# Patient Record
Sex: MALE | Race: OTHER | NOT HISPANIC OR LATINO | URBAN - METROPOLITAN AREA
[De-identification: names, ages, dates, MRNs, and addresses within clinical notes are randomized per-mention and may not be internally consistent; named-entity substitution may affect disease eponyms.]

---

## 2017-01-28 ENCOUNTER — EMERGENCY (EMERGENCY)
Facility: HOSPITAL | Age: 42
LOS: 1 days | Discharge: HOME | End: 2017-01-28
Attending: EMERGENCY MEDICINE | Admitting: EMERGENCY MEDICINE
Payer: MEDICAID

## 2017-01-28 VITALS
HEART RATE: 76 BPM | OXYGEN SATURATION: 100 % | DIASTOLIC BLOOD PRESSURE: 90 MMHG | SYSTOLIC BLOOD PRESSURE: 129 MMHG | TEMPERATURE: 98 F | RESPIRATION RATE: 16 BRPM

## 2017-01-28 VITALS
HEART RATE: 78 BPM | SYSTOLIC BLOOD PRESSURE: 105 MMHG | RESPIRATION RATE: 14 BRPM | OXYGEN SATURATION: 100 % | DIASTOLIC BLOOD PRESSURE: 66 MMHG

## 2017-01-28 PROBLEM — Z00.00 ENCOUNTER FOR PREVENTIVE HEALTH EXAMINATION: Status: ACTIVE | Noted: 2017-01-28

## 2017-01-28 PROCEDURE — 99284 EMERGENCY DEPT VISIT MOD MDM: CPT

## 2017-01-28 RX ORDER — METOCLOPRAMIDE HCL 10 MG
10 TABLET ORAL ONCE
Qty: 0 | Refills: 0 | Status: COMPLETED | OUTPATIENT
Start: 2017-01-28 | End: 2017-01-28

## 2017-01-28 RX ORDER — SODIUM CHLORIDE 9 MG/ML
1000 INJECTION INTRAMUSCULAR; INTRAVENOUS; SUBCUTANEOUS ONCE
Qty: 0 | Refills: 0 | Status: COMPLETED | OUTPATIENT
Start: 2017-01-28 | End: 2017-01-28

## 2017-01-28 RX ADMIN — Medication 10 MILLIGRAM(S): at 12:41

## 2017-01-28 RX ADMIN — SODIUM CHLORIDE 1000 MILLILITER(S): 9 INJECTION INTRAMUSCULAR; INTRAVENOUS; SUBCUTANEOUS at 13:38

## 2017-01-28 RX ADMIN — Medication 100 MILLIGRAM(S): at 12:41

## 2017-01-28 RX ADMIN — SODIUM CHLORIDE 1000 MILLILITER(S): 9 INJECTION INTRAMUSCULAR; INTRAVENOUS; SUBCUTANEOUS at 12:41

## 2017-01-28 NOTE — ED PROVIDER NOTE - MEDICAL DECISION MAKING DETAILS
42 yo male with PMH of EtOH abuse, anxiety disorder, depression, presents to the ED with EtOH withdrawal and headache. States his last drink was 5pm yesterday and began experiencing withdrawal symptoms including headache, nausea, and tremulousness at 9am today. Plan: librium, reglan, IV fluids

## 2017-01-28 NOTE — ED PROVIDER NOTE - ATTENDING CONTRIBUTION TO CARE
DR. LUCERO, ATTENDING MD-  I performed a face to face bedside interview with patient regarding history of present illness, review of symptoms and past medical history. I completed an independent physical exam.  I have discussed patient's plan of care with the resident.   Documentation as above in the note.    42 y/o male with h/o etoh abuse with c/o shakiness this am.  He had his last drink last night at 5pm.  Wants to go to rehab.  He spoke with [facility] and will go there for detox.  Denies f/c, ha, neck stiffness, cp, sob, cough, abd pain, n/v/d, dysuria, rash.  Afebrile, vs wnl, nad, ctabil, s1s2 rrr no m/r/g, abd soft non ttp no r/g, no cva tenderness b/l, no leg swelling b/l, no rash, mild fine tremor.  Mild etoh w/d, will give librium, reassess, pt to call [facility] for transport to center for detox.

## 2017-01-28 NOTE — ED PROVIDER NOTE - CHPI ED SYMPTOMS NEG
no abdominal pain/no abdominal distension/no confusion/no weakness/no fever/no vomiting/no disorientation

## 2017-01-28 NOTE — ED ADULT TRIAGE NOTE - CHIEF COMPLAINT QUOTE
states" I was drinking for the past 3 days and now I feel shaky with a head ache and depression." last drink was yesterday evening at 5pm."

## 2017-01-28 NOTE — PROVIDER CONTACT NOTE (OTHER) - ASSESSMENT
SW contacted by Pt MD, who states the Pt is requesting ETOH Rehab and is known to [facility], in [place] [phone] or [phone], fax [phone], Pt states he wants to go there for rehab. SW attempted to meet with Pt but he'd been given medication and did not wake up.  SW contacted [facility] spoke with MICHELLE Nieves who states he received call from Pt earlier and spoke with Pt's Mother who informed she'd take Pt to the center if he gets accepted. They inform that at this time they do not have a bed available, but will be calling the Pt in about an hour to do the phone intake directly with him, when a bed becomes available Pt can go there from community if discharged. SW to remain available.

## 2017-01-28 NOTE — ED ADULT NURSE NOTE - OBJECTIVE STATEMENT
pt is a 41 yr old male p/t ED to start withdrawal from alcohol. last drink 5pm 1/27/17. pt calm and cooperative at this time. PIV placed, IVF infusing.

## 2017-01-28 NOTE — ED PROVIDER NOTE - OBJECTIVE STATEMENT
40 yo male with PMH of EtOH abuse, anxiety disorder, depression, presents to the ED with EtOH withdrawal and headache. States his last drink was 5pm yesterday and began experiencing withdrawal symptoms including headache, nausea, and tremulousness at 9am today. Usually drinks a quart of liquor a day on average when using EtOH. States he moved to [place] from [place] several weeks ago and though he was in a rehab program in [place] and was a recovering alcoholic, he began drinking 4 days ago here in [place]  when he felt depressed. Denies SI or HI, wants to go into rehab in [place] and has a place in mind he plans to go to this week, [facility] Patient A&O x3.

## 2017-03-09 ENCOUNTER — EMERGENCY (EMERGENCY)
Facility: HOSPITAL | Age: 42
LOS: 1 days | End: 2017-03-09
Payer: MEDICAID

## 2017-03-09 PROCEDURE — 12001 RPR S/N/AX/GEN/TRNK 2.5CM/<: CPT

## 2017-03-09 PROCEDURE — 99283 EMERGENCY DEPT VISIT LOW MDM: CPT | Mod: 25

## 2017-03-10 PROCEDURE — 73630 X-RAY EXAM OF FOOT: CPT | Mod: 26,RT

## 2019-02-09 NOTE — ED ADULT NURSE NOTE - TEMPLATE
General
bilateral  lower extremity Active ROM was WFL (within functional limits)/bilateral upper extremity Active ROM was WFL (within functional limits)/increased thoracic kyphosis

## 2019-05-01 ENCOUNTER — OUTPATIENT (OUTPATIENT)
Dept: OUTPATIENT SERVICES | Facility: HOSPITAL | Age: 44
LOS: 1 days | End: 2019-05-01
Payer: MEDICAID

## 2019-05-01 PROCEDURE — G9001: CPT

## 2019-05-18 ENCOUNTER — INPATIENT (INPATIENT)
Facility: HOSPITAL | Age: 44
LOS: 1 days | Discharge: HOME | End: 2019-05-20
Attending: INTERNAL MEDICINE | Admitting: INTERNAL MEDICINE
Payer: MEDICAID

## 2019-05-18 VITALS
SYSTOLIC BLOOD PRESSURE: 98 MMHG | RESPIRATION RATE: 16 BRPM | TEMPERATURE: 98 F | HEART RATE: 72 BPM | OXYGEN SATURATION: 100 % | DIASTOLIC BLOOD PRESSURE: 59 MMHG

## 2019-05-18 DIAGNOSIS — R74.8 ABNORMAL LEVELS OF OTHER SERUM ENZYMES: ICD-10-CM

## 2019-05-18 DIAGNOSIS — N19 UNSPECIFIED KIDNEY FAILURE: ICD-10-CM

## 2019-05-18 DIAGNOSIS — L03.90 CELLULITIS, UNSPECIFIED: ICD-10-CM

## 2019-05-18 DIAGNOSIS — L03.818 CELLULITIS OF OTHER SITES: ICD-10-CM

## 2019-05-18 DIAGNOSIS — F32.9 MAJOR DEPRESSIVE DISORDER, SINGLE EPISODE, UNSPECIFIED: ICD-10-CM

## 2019-05-18 PROBLEM — F10.10 ALCOHOL ABUSE, UNCOMPLICATED: Chronic | Status: ACTIVE | Noted: 2017-01-28

## 2019-05-18 PROBLEM — F41.9 ANXIETY DISORDER, UNSPECIFIED: Chronic | Status: ACTIVE | Noted: 2017-01-28

## 2019-05-18 LAB
ALBUMIN SERPL ELPH-MCNC: 3.9 G/DL — SIGNIFICANT CHANGE UP (ref 3.3–5)
ALP SERPL-CCNC: 64 U/L — SIGNIFICANT CHANGE UP (ref 40–120)
ALT FLD-CCNC: 46 U/L — HIGH (ref 4–41)
ANION GAP SERPL CALC-SCNC: 11 MMO/L — SIGNIFICANT CHANGE UP (ref 7–14)
AST SERPL-CCNC: 48 U/L — HIGH (ref 4–40)
BASE EXCESS BLDV CALC-SCNC: 6.1 MMOL/L — SIGNIFICANT CHANGE UP
BASOPHILS # BLD AUTO: 0 K/UL — SIGNIFICANT CHANGE UP (ref 0–0.2)
BASOPHILS NFR BLD AUTO: 0 % — SIGNIFICANT CHANGE UP (ref 0–2)
BILIRUB SERPL-MCNC: 1.1 MG/DL — SIGNIFICANT CHANGE UP (ref 0.2–1.2)
BLOOD GAS VENOUS - CREATININE: 1.36 MG/DL — HIGH (ref 0.5–1.3)
BUN SERPL-MCNC: 23 MG/DL — SIGNIFICANT CHANGE UP (ref 7–23)
CALCIUM SERPL-MCNC: 9 MG/DL — SIGNIFICANT CHANGE UP (ref 8.4–10.5)
CHLORIDE BLDV-SCNC: 104 MMOL/L — SIGNIFICANT CHANGE UP (ref 96–108)
CHLORIDE SERPL-SCNC: 100 MMOL/L — SIGNIFICANT CHANGE UP (ref 98–107)
CK SERPL-CCNC: 511 U/L — HIGH (ref 30–200)
CO2 SERPL-SCNC: 28 MMOL/L — SIGNIFICANT CHANGE UP (ref 22–31)
CREAT SERPL-MCNC: 1.5 MG/DL — HIGH (ref 0.5–1.3)
EOSINOPHIL # BLD AUTO: 0.12 K/UL — SIGNIFICANT CHANGE UP (ref 0–0.5)
EOSINOPHIL NFR BLD AUTO: 2.4 % — SIGNIFICANT CHANGE UP (ref 0–6)
GAS PNL BLDV: 132 MMOL/L — LOW (ref 136–146)
GLUCOSE BLDV-MCNC: 85 MG/DL — SIGNIFICANT CHANGE UP (ref 70–99)
GLUCOSE SERPL-MCNC: 87 MG/DL — SIGNIFICANT CHANGE UP (ref 70–99)
HCO3 BLDV-SCNC: 28 MMOL/L — HIGH (ref 20–27)
HCT VFR BLD CALC: 39.9 % — SIGNIFICANT CHANGE UP (ref 39–50)
HCT VFR BLDV CALC: 43.2 % — SIGNIFICANT CHANGE UP (ref 39–51)
HGB BLD-MCNC: 13.8 G/DL — SIGNIFICANT CHANGE UP (ref 13–17)
HGB BLDV-MCNC: 14.1 G/DL — SIGNIFICANT CHANGE UP (ref 13–17)
IMM GRANULOCYTES NFR BLD AUTO: 0.4 % — SIGNIFICANT CHANGE UP (ref 0–1.5)
LACTATE BLDV-MCNC: 1.9 MMOL/L — SIGNIFICANT CHANGE UP (ref 0.5–2)
LYMPHOCYTES # BLD AUTO: 0.88 K/UL — LOW (ref 1–3.3)
LYMPHOCYTES # BLD AUTO: 17.3 % — SIGNIFICANT CHANGE UP (ref 13–44)
MCHC RBC-ENTMCNC: 32.4 PG — SIGNIFICANT CHANGE UP (ref 27–34)
MCHC RBC-ENTMCNC: 34.6 % — SIGNIFICANT CHANGE UP (ref 32–36)
MCV RBC AUTO: 93.7 FL — SIGNIFICANT CHANGE UP (ref 80–100)
MONOCYTES # BLD AUTO: 0.28 K/UL — SIGNIFICANT CHANGE UP (ref 0–0.9)
MONOCYTES NFR BLD AUTO: 5.5 % — SIGNIFICANT CHANGE UP (ref 2–14)
NEUTROPHILS # BLD AUTO: 3.79 K/UL — SIGNIFICANT CHANGE UP (ref 1.8–7.4)
NEUTROPHILS NFR BLD AUTO: 74.4 % — SIGNIFICANT CHANGE UP (ref 43–77)
NRBC # FLD: 0 K/UL — SIGNIFICANT CHANGE UP (ref 0–0)
PCO2 BLDV: 52 MMHG — HIGH (ref 41–51)
PH BLDV: 7.4 PH — SIGNIFICANT CHANGE UP (ref 7.32–7.43)
PLATELET # BLD AUTO: 148 K/UL — LOW (ref 150–400)
PMV BLD: 8.2 FL — SIGNIFICANT CHANGE UP (ref 7–13)
PO2 BLDV: 40 MMHG — SIGNIFICANT CHANGE UP (ref 35–40)
POTASSIUM BLDV-SCNC: 3.5 MMOL/L — SIGNIFICANT CHANGE UP (ref 3.4–4.5)
POTASSIUM SERPL-MCNC: 4 MMOL/L — SIGNIFICANT CHANGE UP (ref 3.5–5.3)
POTASSIUM SERPL-SCNC: 4 MMOL/L — SIGNIFICANT CHANGE UP (ref 3.5–5.3)
PROT SERPL-MCNC: 6.1 G/DL — SIGNIFICANT CHANGE UP (ref 6–8.3)
RBC # BLD: 4.26 M/UL — SIGNIFICANT CHANGE UP (ref 4.2–5.8)
RBC # FLD: 13 % — SIGNIFICANT CHANGE UP (ref 10.3–14.5)
SAO2 % BLDV: 71.1 % — SIGNIFICANT CHANGE UP (ref 60–85)
SODIUM SERPL-SCNC: 139 MMOL/L — SIGNIFICANT CHANGE UP (ref 135–145)
WBC # BLD: 5.09 K/UL — SIGNIFICANT CHANGE UP (ref 3.8–10.5)
WBC # FLD AUTO: 5.09 K/UL — SIGNIFICANT CHANGE UP (ref 3.8–10.5)

## 2019-05-18 PROCEDURE — 73130 X-RAY EXAM OF HAND: CPT | Mod: 26,RT

## 2019-05-18 PROCEDURE — 73060 X-RAY EXAM OF HUMERUS: CPT | Mod: 26,RT

## 2019-05-18 PROCEDURE — 99223 1ST HOSP IP/OBS HIGH 75: CPT

## 2019-05-18 PROCEDURE — 73090 X-RAY EXAM OF FOREARM: CPT | Mod: 26,RT

## 2019-05-18 PROCEDURE — 73552 X-RAY EXAM OF FEMUR 2/>: CPT | Mod: 26,RT

## 2019-05-18 PROCEDURE — 73110 X-RAY EXAM OF WRIST: CPT | Mod: 26,RT

## 2019-05-18 RX ORDER — DIPHENHYDRAMINE HCL 50 MG
25 CAPSULE ORAL EVERY 4 HOURS
Refills: 0 | Status: DISCONTINUED | OUTPATIENT
Start: 2019-05-18 | End: 2019-05-20

## 2019-05-18 RX ORDER — DIPHENHYDRAMINE HCL 50 MG
50 CAPSULE ORAL ONCE
Refills: 0 | Status: COMPLETED | OUTPATIENT
Start: 2019-05-18 | End: 2019-05-18

## 2019-05-18 RX ORDER — TRAZODONE HCL 50 MG
100 TABLET ORAL AT BEDTIME
Refills: 0 | Status: DISCONTINUED | OUTPATIENT
Start: 2019-05-18 | End: 2019-05-20

## 2019-05-18 RX ORDER — VANCOMYCIN HCL 1 G
1000 VIAL (EA) INTRAVENOUS ONCE
Refills: 0 | Status: COMPLETED | OUTPATIENT
Start: 2019-05-18 | End: 2019-05-18

## 2019-05-18 RX ORDER — SODIUM CHLORIDE 9 MG/ML
1000 INJECTION, SOLUTION INTRAVENOUS ONCE
Refills: 0 | Status: COMPLETED | OUTPATIENT
Start: 2019-05-18 | End: 2019-05-18

## 2019-05-18 RX ORDER — ESCITALOPRAM OXALATE 10 MG/1
1 TABLET, FILM COATED ORAL
Qty: 0 | Refills: 0 | DISCHARGE

## 2019-05-18 RX ORDER — DIPHENHYDRAMINE HCL 50 MG
50 CAPSULE ORAL EVERY 4 HOURS
Refills: 0 | Status: DISCONTINUED | OUTPATIENT
Start: 2019-05-18 | End: 2019-05-18

## 2019-05-18 RX ORDER — SODIUM CHLORIDE 9 MG/ML
1000 INJECTION, SOLUTION INTRAVENOUS
Refills: 0 | Status: DISCONTINUED | OUTPATIENT
Start: 2019-05-18 | End: 2019-05-20

## 2019-05-18 RX ORDER — AMPICILLIN SODIUM AND SULBACTAM SODIUM 250; 125 MG/ML; MG/ML
3 INJECTION, POWDER, FOR SUSPENSION INTRAMUSCULAR; INTRAVENOUS ONCE
Refills: 0 | Status: COMPLETED | OUTPATIENT
Start: 2019-05-18 | End: 2019-05-18

## 2019-05-18 RX ORDER — ESCITALOPRAM OXALATE 10 MG/1
20 TABLET, FILM COATED ORAL DAILY
Refills: 0 | Status: DISCONTINUED | OUTPATIENT
Start: 2019-05-18 | End: 2019-05-20

## 2019-05-18 RX ORDER — TRAZODONE HCL 50 MG
1 TABLET ORAL
Qty: 0 | Refills: 0 | DISCHARGE

## 2019-05-18 RX ADMIN — SODIUM CHLORIDE 1000 MILLILITER(S): 9 INJECTION, SOLUTION INTRAVENOUS at 14:09

## 2019-05-18 RX ADMIN — SODIUM CHLORIDE 100 MILLILITER(S): 9 INJECTION, SOLUTION INTRAVENOUS at 21:18

## 2019-05-18 RX ADMIN — Medication 25 MILLIGRAM(S): at 21:28

## 2019-05-18 RX ADMIN — Medication 100 MILLIGRAM(S): at 21:52

## 2019-05-18 RX ADMIN — Medication 50 MILLIGRAM(S): at 14:17

## 2019-05-18 RX ADMIN — Medication 250 MILLIGRAM(S): at 14:09

## 2019-05-18 RX ADMIN — AMPICILLIN SODIUM AND SULBACTAM SODIUM 200 GRAM(S): 250; 125 INJECTION, POWDER, FOR SUSPENSION INTRAMUSCULAR; INTRAVENOUS at 15:09

## 2019-05-18 NOTE — ED PROVIDER NOTE - ATTENDING CONTRIBUTION TO CARE
57M p/w R flank pain x 3 days, rad to RUQ.  H/o kidney stones, feels similar.  worse with laying on L side, bending over.  (+)N.  Some urinary hesitancy.  No dysuria or hematuria.  Some impv with ibuprofen / codeine.  Recent bunion surgery.  No cough, fever.  No abd ttp.  Cellulitis of RUE, also possibly RLE as well as R side of neck.  Unusual distribution of cellulitis, given large surface area of cellulitis of RUE would admit for further workup.   VS:  unremarkable except BP soft    GEN - NAD; malaise; A+O x3 (+)Muscular  HEAD - NC/AT     ENT - PEERL, EOMI, mucous membranes dry , no discharge      NECK: Neck supple, non-tender without lymphadenopathy, no masses, no JVD  PULM - CTA b/l,  symmetric breath sounds  COR -  normal heart sounds    ABD - , ND, NT, soft,  BACK - no CVA tenderness, nontender spine     EXTREMS - no edema, no deformity, warm and well perfused    SKIN - no rash or bruising    except RLE red, warm, mild induration surrounding a pinpoint scab, no palpable fluctuance.  Knee bones nontender, normal ROM R knee.  RUE red, induration, swelling from shoulder to fingers, edema to hand, no palpable abscess or skin breakdown.  R side of neck redness, erythema, no palpable abscess or skin breakdown.  NEUROLOGIC - alert, CN 2-12 intact, sensation nl, motor no focal deficit.

## 2019-05-18 NOTE — ED PROVIDER NOTE - PROGRESS NOTE DETAILS
called and spoke with [facility] hospitalist can admit to Dr. Roderick Bai. pt stable.

## 2019-05-18 NOTE — ED PROVIDER NOTE - PHYSICAL EXAMINATION
Diffuse R arm swelling and redness. No tenderness to palpation, compartments soft, pulses intact  R lateral thigh redness with scabbed area on R Lateral thigh  compartments soft, moving all digits, sensation intact, pulses normal  FROM of all extremities  Neck FROM Diffuse R arm swelling and redness. No tenderness to palpation, compartments soft, pulses intact, no crepitus   R lateral thigh redness with scabbed area on R Lateral thigh  compartments soft, moving all digits, sensation intact, pulses normal  FROM of all extremities  Neck FROM  faint area of erythema to posterior L arm

## 2019-05-18 NOTE — H&P ADULT - PROBLEM SELECTOR PLAN 1
- RUE  - likely secondary to insect bites (has punctate areas on skin suggestive of same); possibility of mosquitos  - itchy, streaky erythema, swelling, along with pain of the digits   - subjective fever  - started on Unasyn and vancomycin in the ED; will continue with Unasyn for now  - will also add one dose of steroid; prednisone 20 mg  - benadryl 25 mg PO Q4H PRN itching  - would get ID consult in the AM  - f/u cultures

## 2019-05-18 NOTE — H&P ADULT - ASSESSMENT
43 year old male, with past history significant for Anxiety, Depression, Alcohol abuse (last use ~ 2017), and Smoking, presented to the ED, after being sent from an urgent care center, secondary to swelling and redness of the right arm and redness of the lateral right thigh.  Vital signs upon ED presentation as follows: BP = 98/59, HR = 72, RR = 16, T = 36.7 C (98 F), O2 Sat = 100% on ___.  X-rays of the areas negative for any acute findings.  Diagnosed with Cellulitis.  Admitted for further management of:

## 2019-05-18 NOTE — H&P ADULT - NSHPSOCIALHISTORY_GEN_ALL_CORE
Single  Works   History of cigarette smoking; quit ~ 2017.  Smoked 0.5 ppd x 20 years  History of alcohol abuse; quit ~ 2017.  Drank x 20 years  History of marijuana, cocaine use; quit ~ age 31    Independently ambulatory at baseline Single  Works involves MRIs in a medical setting  History of cigarette smoking; quit ~ 2017.  Smoked 0.5 ppd x 20 years  History of alcohol abuse; quit ~ 2017.  Drank x 20 years  History of marijuana, cocaine use; quit ~ age 31    Independently ambulatory at baseline

## 2019-05-18 NOTE — H&P ADULT - PROBLEM SELECTOR PLAN 5
- asymptomatic presently  - continues on trazodone 100 mg HS and Lexapro 20 mg AC  - f/u TSH and Vit D levels in the AM  - evaluate intermittently for any acute signs/symptoms of decompensation

## 2019-05-18 NOTE — H&P ADULT - PROBLEM SELECTOR PLAN 4
- asymptomatic presently  - continues on trazodone 100 mg HS and Lexapro 20 mg AC  - f/u TSH and Vit D levels in the AM  - evaluate intermittently for any acute signs/symptoms of decompensation - BUN/Cr = 23/1.50  - with CPK elevation of 511 and history of taking protein and other supplements in the setting of intense exercise regimen  - possibly also affected by infection  - no dysuria, no CVA tenderness  - f/u trend for improvement in the AM  - advised to maintain adequate hydration

## 2019-05-18 NOTE — ED ADULT TRIAGE NOTE - CHIEF COMPLAINT QUOTE
pt comes to ED for infected bug bite and cellulites to R arm. pt states he was bitten by a bug yesterday. pt does not know what kind. pt arm is very red, swollen, warm to touch. pt BP is 98/59 pt states that is normal for him. pt was sent to ED from urgent care. pt denies any difficulty talking, or swallowing.

## 2019-05-18 NOTE — H&P ADULT - PROBLEM SELECTOR PLAN 2
- level = 511  - likely as sequela of intense exercise regimen, along with taking protein and other supplements (creatine, protein, CLA, carnitine, tea extracts...)  - could also be affected by current infection  - s/p 2 liters LR in the ED; additional fluids of LR at 100 mL/Hr x 20 hours in effect  - f/u repeat CK level - right lower medial thigh area, with swelling, mild erythema  - coinciding w/ cellulitis of RUE  - management as above

## 2019-05-18 NOTE — H&P ADULT - PROBLEM SELECTOR PLAN 3
- BUN/Cr = 23/1.50  - with CPK elevation of 511 and history of taking protein and other supplements in the setting of intense exercise regimen  - possibly also affected by infection  - no dysuria, no CVA tenderness  - f/u trend for improvement in the AM  - advised to maintain adequate hydration - level = 511  - likely as sequela of intense exercise regimen, along with taking protein and other supplements (creatine, protein, CLA, carnitine, tea extracts...)  - could also be affected by current infection  - s/p 2 liters LR in the ED; additional fluids of LR at 100 mL/Hr x 20 hours in effect  - f/u repeat CK level

## 2019-05-18 NOTE — ED ADULT NURSE NOTE - OBJECTIVE STATEMENT
Patient received to intake 5, multiple areas of red ness, swelling, itching to several areas of his body. both rt leg and rt arm very red and swollen. c.o. itching in rt upper arm. ice pac given. no difficulty breathing. puncture wound noted in middle of reddness on rt leg. sl placed labs sent. LR infusing. abx started after blood cultures. + rt radial and +rt pedal pulses easily palpable.

## 2019-05-18 NOTE — ED PROVIDER NOTE - OBJECTIVE STATEMENT
43 y.o male anxiety/depression ( on trazadone and Lexapro for years) former smoker, former ETOH abuse last drink 2 years ago coming in with R arm swelling and redness and redness to lateral right thigh since yesterday. Went to urgent care today and was sent to ED for further eval. Pt states areas are very pruritic, not painful. Subjective fevers at home.  Denies chest pain, sob, cough, n/v/d, abdominal pain, numbness, tingling, weakness, urinary symptoms. Denies any drug use no IVDU. No new medications, no recent travel, does not recall being bitten by anything.

## 2019-05-18 NOTE — H&P ADULT - NSHPLABSRESULTS_GEN_ALL_CORE
13.8   5.09  )-----------( 148      ( 18 May 2019 13:45 )             39.9       05-18    139  |  100  |  23  ----------------------------<  87  4.0   |  28  |  1.50<H>    Ca    9.0      18 May 2019 13:45    TPro  6.1  /  Alb  3.9  /  TBili  1.1  /  DBili  x   /  AST  48<H>  /  ALT  46<H>  /  AlkPhos  64  05-18    Lactate Trend = 1.9      CARDIAC MARKERS ( 18 May 2019 13:45 )  x     / x     / 511 u/L / x     / x        CAPILLARY BLOOD GLUCOSE

## 2019-05-18 NOTE — ED PROVIDER NOTE - CLINICAL SUMMARY MEDICAL DECISION MAKING FREE TEXT BOX
43 y.o male anxiety/depression ( on trazadone and Lexapro for years) former smoker, former ETOH abuse last drink 2 years ago coming in with R arm swelling and redness and redness to lateral right thigh since yesterday. Will obtain labs, blood cultures, xray R arm/thigh. Give fluids, abx, benadryl for pruritis and admit. Arm elevated and areas of erythema demarcated.

## 2019-05-18 NOTE — H&P ADULT - MUSCULOSKELETAL COMMENTS
slight dactylitic appearance of the right hand, swelling of the RUE swelling of the RUE and R-thigh; pain of the fingers of the right hand

## 2019-05-18 NOTE — ED ADULT NURSE NOTE - NSIMPLEMENTINTERV_GEN_ALL_ED
Implemented All Universal Safety Interventions:  [place] to call system. Call bell, personal items and telephone within reach. Instruct patient to call for assistance. Room bathroom lighting operational. Non-slip footwear when patient is off stretcher. Physically safe environment: no spills, clutter or unnecessary equipment. Stretcher in lowest position, wheels locked, appropriate side rails in place.

## 2019-05-18 NOTE — H&P ADULT - SKIN COMMENTS
swelling and streaky erythema of the right anteromedial arm, some erythema of the right forearm and thigh

## 2019-05-18 NOTE — H&P ADULT - HISTORY OF PRESENT ILLNESS
43 year old male, with past history significant for Anxiety, Depression, Alcohol abuse (last use ~ 2017), and Smoking, presented to the ED, after being sent from an urgent care center, secondary to swelling and redness of the right arm and redness of the lateral right thigh.  Seen and evaluated at bedside;    Vital signs upon ED presentation as follows: BP = 98/59, HR = 72, RR = 16, T = 36.7 C (98 F), O2 Sat = 100% on ___.  X-rays of the areas negative for any acute findings.  Diagnosed with Cellulitis.  Prescribed Unasyn and vancomycin in the ED. 43 year old male, with past history significant for Anxiety, Depression, Alcohol abuse (last use ~ 2017), and Smoking, presented to the ED, after being sent from an urgent care center, secondary to swelling and redness of the right arm and redness of the lateral right thigh.  Seen and evaluated at bedside; NAD.  Patient relates waking up in the morning of the previous day, with itching of the right arm and thigh; noted punctate areas suggestive of insect bites.  Suffers with significant itching and erythema to the cheryl-medial aspect of the arm and extending down to the forearm.  Right arm, forearm and hand have become swollen, and patient now experiences pain of the finger joints.  Had subjective fever, but no chills or sweating.  Unsure of  what insect might have caused his current issue, but stated that he had slept with the window opened.  Has had inset bites in the past, but no similar reaction.  No reports of headaches, shortness of breath, chest pain, palpitations or any other associated signs/symptoms.    Patient exercises extensively and takes supplements; creatinine, protein, CLA, carnitine, tea extracts.    Vital signs upon ED presentation as follows: BP = 98/59, HR = 72, RR = 16, T = 36.7 C (98 F), O2 Sat = 100% on ___.  X-rays of the areas negative for any acute findings.  Diagnosed with Cellulitis.  Prescribed Unasyn and vancomycin in the ED.

## 2019-05-19 VITALS
HEART RATE: 60 BPM | DIASTOLIC BLOOD PRESSURE: 65 MMHG | TEMPERATURE: 98 F | OXYGEN SATURATION: 97 % | SYSTOLIC BLOOD PRESSURE: 107 MMHG | RESPIRATION RATE: 18 BRPM

## 2019-05-19 DIAGNOSIS — L03.113 CELLULITIS OF RIGHT UPPER LIMB: ICD-10-CM

## 2019-05-19 DIAGNOSIS — L03.119 CELLULITIS OF UNSPECIFIED PART OF LIMB: ICD-10-CM

## 2019-05-19 LAB
ALBUMIN SERPL ELPH-MCNC: 3.5 G/DL — SIGNIFICANT CHANGE UP (ref 3.3–5)
ALP SERPL-CCNC: 65 U/L — SIGNIFICANT CHANGE UP (ref 40–120)
ALT FLD-CCNC: 64 U/L — HIGH (ref 4–41)
ANION GAP SERPL CALC-SCNC: 9 MMO/L — SIGNIFICANT CHANGE UP (ref 7–14)
APPEARANCE UR: CLEAR — SIGNIFICANT CHANGE UP
AST SERPL-CCNC: 58 U/L — HIGH (ref 4–40)
BILIRUB SERPL-MCNC: 1 MG/DL — SIGNIFICANT CHANGE UP (ref 0.2–1.2)
BILIRUB UR-MCNC: NEGATIVE — SIGNIFICANT CHANGE UP
BLOOD UR QL VISUAL: NEGATIVE — SIGNIFICANT CHANGE UP
BUN SERPL-MCNC: 22 MG/DL — SIGNIFICANT CHANGE UP (ref 7–23)
CALCIUM SERPL-MCNC: 8.8 MG/DL — SIGNIFICANT CHANGE UP (ref 8.4–10.5)
CHLORIDE SERPL-SCNC: 108 MMOL/L — HIGH (ref 98–107)
CK SERPL-CCNC: 258 U/L — HIGH (ref 30–200)
CO2 SERPL-SCNC: 26 MMOL/L — SIGNIFICANT CHANGE UP (ref 22–31)
COLOR SPEC: SIGNIFICANT CHANGE UP
CREAT SERPL-MCNC: 1.25 MG/DL — SIGNIFICANT CHANGE UP (ref 0.5–1.3)
GLUCOSE SERPL-MCNC: 88 MG/DL — SIGNIFICANT CHANGE UP (ref 70–99)
GLUCOSE UR-MCNC: NEGATIVE — SIGNIFICANT CHANGE UP
HCT VFR BLD CALC: 39.1 % — SIGNIFICANT CHANGE UP (ref 39–50)
HGB BLD-MCNC: 12.8 G/DL — LOW (ref 13–17)
KETONES UR-MCNC: NEGATIVE — SIGNIFICANT CHANGE UP
LEUKOCYTE ESTERASE UR-ACNC: NEGATIVE — SIGNIFICANT CHANGE UP
MAGNESIUM SERPL-MCNC: 1.7 MG/DL — SIGNIFICANT CHANGE UP (ref 1.6–2.6)
MCHC RBC-ENTMCNC: 31.3 PG — SIGNIFICANT CHANGE UP (ref 27–34)
MCHC RBC-ENTMCNC: 32.7 % — SIGNIFICANT CHANGE UP (ref 32–36)
MCV RBC AUTO: 95.6 FL — SIGNIFICANT CHANGE UP (ref 80–100)
NITRITE UR-MCNC: NEGATIVE — SIGNIFICANT CHANGE UP
NRBC # FLD: 0 K/UL — SIGNIFICANT CHANGE UP (ref 0–0)
PH UR: 7 — SIGNIFICANT CHANGE UP (ref 5–8)
PHOSPHATE SERPL-MCNC: 3.4 MG/DL — SIGNIFICANT CHANGE UP (ref 2.5–4.5)
PLATELET # BLD AUTO: 127 K/UL — LOW (ref 150–400)
PMV BLD: 8.3 FL — SIGNIFICANT CHANGE UP (ref 7–13)
POTASSIUM SERPL-MCNC: 4.2 MMOL/L — SIGNIFICANT CHANGE UP (ref 3.5–5.3)
POTASSIUM SERPL-SCNC: 4.2 MMOL/L — SIGNIFICANT CHANGE UP (ref 3.5–5.3)
PROT SERPL-MCNC: 5.6 G/DL — LOW (ref 6–8.3)
PROT UR-MCNC: NEGATIVE — SIGNIFICANT CHANGE UP
RBC # BLD: 4.09 M/UL — LOW (ref 4.2–5.8)
RBC # FLD: 12.8 % — SIGNIFICANT CHANGE UP (ref 10.3–14.5)
RBC CASTS # UR COMP ASSIST: SIGNIFICANT CHANGE UP (ref 0–?)
SODIUM SERPL-SCNC: 143 MMOL/L — SIGNIFICANT CHANGE UP (ref 135–145)
SP GR SPEC: 1.01 — SIGNIFICANT CHANGE UP (ref 1–1.04)
SPECIMEN SOURCE: SIGNIFICANT CHANGE UP
SPECIMEN SOURCE: SIGNIFICANT CHANGE UP
TSH SERPL-MCNC: 1.17 UIU/ML — SIGNIFICANT CHANGE UP (ref 0.27–4.2)
UROBILINOGEN FLD QL: NORMAL — SIGNIFICANT CHANGE UP
WBC # BLD: 4.02 K/UL — SIGNIFICANT CHANGE UP (ref 3.8–10.5)
WBC # FLD AUTO: 4.02 K/UL — SIGNIFICANT CHANGE UP (ref 3.8–10.5)
WBC UR QL: SIGNIFICANT CHANGE UP (ref 0–?)

## 2019-05-19 RX ORDER — AMPICILLIN SODIUM AND SULBACTAM SODIUM 250; 125 MG/ML; MG/ML
1.5 INJECTION, POWDER, FOR SUSPENSION INTRAMUSCULAR; INTRAVENOUS EVERY 6 HOURS
Refills: 0 | Status: DISCONTINUED | OUTPATIENT
Start: 2019-05-19 | End: 2019-05-19

## 2019-05-19 RX ADMIN — Medication 1 TABLET(S): at 17:03

## 2019-05-19 RX ADMIN — AMPICILLIN SODIUM AND SULBACTAM SODIUM 100 GRAM(S): 250; 125 INJECTION, POWDER, FOR SUSPENSION INTRAMUSCULAR; INTRAVENOUS at 06:37

## 2019-05-19 RX ADMIN — Medication 25 MILLIGRAM(S): at 15:18

## 2019-05-19 RX ADMIN — AMPICILLIN SODIUM AND SULBACTAM SODIUM 100 GRAM(S): 250; 125 INJECTION, POWDER, FOR SUSPENSION INTRAMUSCULAR; INTRAVENOUS at 01:10

## 2019-05-19 RX ADMIN — ESCITALOPRAM OXALATE 20 MILLIGRAM(S): 10 TABLET, FILM COATED ORAL at 11:47

## 2019-05-19 RX ADMIN — Medication 20 MILLIGRAM(S): at 06:36

## 2019-05-19 RX ADMIN — Medication 25 MILLIGRAM(S): at 23:18

## 2019-05-19 RX ADMIN — SODIUM CHLORIDE 100 MILLILITER(S): 9 INJECTION, SOLUTION INTRAVENOUS at 06:36

## 2019-05-19 RX ADMIN — Medication 100 MILLIGRAM(S): at 23:17

## 2019-05-19 NOTE — CONSULT NOTE ADULT - SUBJECTIVE AND OBJECTIVE BOX
HPI:  43 year old male, with past history significant for Anxiety, Depression, Alcohol abuse (last use ~ ), and Smoking, presented to the ED, after being sent from an urgent care center, secondary to swelling and redness of the right arm and redness of the lateral right thigh.  Seen and evaluated at bedside; NAD.  Patient relates waking up in the morning of the previous day, with itching of the right arm and thigh; noted punctate areas suggestive of insect bites.  Suffers with significant itching and erythema to the cheryl-medial aspect of the arm and extending down to the forearm.  Right arm, forearm and hand have become swollen, and patient now experiences pain of the finger joints.  Had subjective fever, but no chills or sweating.  Unsure of  what insect might have caused his current issue, but stated that he had slept with the window opened.  Has had inset bites in the past, but no similar reaction.  No reports of headaches, shortness of breath, chest pain, palpitations or any other associated signs/symptoms.    Patient exercises extensively and takes supplements; creatinine, protein, CLA, carnitine, tea extracts.    Vital signs upon ED presentation as follows: BP = 98/59, HR = 72, RR = 16, T = 36.7 C (98 F), O2 Sat = 100% on ___.  X-rays of the areas negative for any acute findings.  Diagnosed with Cellulitis.  Prescribed Unasyn and vancomycin in the ED. (18 May 2019 20:37)    When I see the patient the erythema is significantly resolved and receded from inked margins      PAST MEDICAL & SURGICAL HISTORY:  Anxiety  Depression  ETOH abuse  No significant past surgical history      Antimicrobials  ampicillin/sulbactam  IVPB 1.5 Gram(s) IV Intermittent every 6 hours      Immunological      Other  diphenhydrAMINE 25 milliGRAM(s) Oral every 4 hours PRN  escitalopram 20 milliGRAM(s) Oral daily  lactated ringers. 1000 milliLiter(s) IV Continuous <Continuous>  traZODone 100 milliGRAM(s) Oral at bedtime      Allergies    No Known Allergies    Intolerances        SOCIAL HISTORY:    FAMILY HISTORY:  FH: lung cancer: ~ father (smoker);  in   FH: diabetes mellitus: ~ father      ROS:    EYES:  Negative  blurry vision or double vision  GASTROINTESTINAL:  Negative for nausea, vomiting, diarrhea  -otherwise negative except for subjective    Vital Signs Last 24 Hrs  T(C): 36.8 (19 May 2019 06:46), Max: 36.8 (18 May 2019 16:48)  T(F): 98.2 (19 May 2019 06:46), Max: 98.3 (18 May 2019 16:48)  HR: 73 (19 May 2019 06:46) (54 - 73)  BP: 105/60 (19 May 2019 06:46) (95/57 - 111/61)  BP(mean): --  RR: 18 (19 May 2019 06:46) (16 - 18)  SpO2: 100% (19 May 2019 06:46) (97% - 100%)    PE:  WDWN in no distress  HEENT:  NC, PERRL, sclerae anicteric, conjunctivae clear, EOMI.  Sinuses nontender, no nasal exudate.  No buccal or pharyngeal lesions, erythema or exudate  Neck:  Supple, no adenopathy  Lungs:  No accessory muscle use  Cor:  RRR,  Abd:  Symmetric, Soft  Extrem/Skin: right arm and right thigh with scratch marks, minimal erythema  Neuro: grossly intact  Musc: moving all limbs freely, no focal deficits    LABS:                        12.8   4.02  )-----------( 127      ( 19 May 2019 05:50 )             39.1       WBC Count: 4.02 K/uL (19 @ 05:50)  WBC Count: 5.09 K/uL (19 @ 13:45)          143  |  108<H>  |  22  ----------------------------<  88  4.2   |  26  |  1.25    Ca    8.8      19 May 2019 05:50  Phos  3.4       Mg     1.7         TPro  5.6<L>  /  Alb  3.5  /  TBili  1.0  /  DBili  x   /  AST  58<H>  /  ALT  64<H>  /  AlkPhos  65        Creatinine, Serum: 1.25 mg/dL (19 @ 05:50)  Creatinine, Serum: 1.50 mg/dL (19 @ 13:45)      Urinalysis Basic - ( 19 May 2019 07:00 )    Color: LIGHT YELLOW / Appearance: CLEAR / S.015 / pH: 7.0  Gluc: NEGATIVE / Ketone: NEGATIVE  / Bili: NEGATIVE / Urobili: NORMAL   Blood: NEGATIVE / Protein: NEGATIVE / Nitrite: NEGATIVE   Leuk Esterase: NEGATIVE / RBC: 0-2 / WBC 0-2   Sq Epi: x / Non Sq Epi: x / Bacteria: x            MICROBIOLOGY:      RADIOLOGY & ADDITIONAL STUDIES:    --

## 2019-05-19 NOTE — PROGRESS NOTE ADULT - PROBLEM SELECTOR PLAN 1
- RUE  - likely secondary to insect bites (has punctate areas on skin suggestive of same); possibility of mosquitos  - itchy, streaky erythema, swelling, along with pain of the digits   - subjective fever  - started on Unasyn and vancomycin in the ED; will continue with augmentin for now  - s/p one dose of steroid; prednisone 20 mg  - benadryl 25 mg PO Q4H PRN itching  - appreciate ID  - f/u cultures

## 2019-05-19 NOTE — PROGRESS NOTE ADULT - SUBJECTIVE AND OBJECTIVE BOX
Patient is a 43y old  Male who presents with a chief complaint of Cellulitis (19 May 2019 09:51)      SUBJECTIVE / OVERNIGHT EVENTS:        Vital Signs Last 24 Hrs  T(C): 36.8 (19 May 2019 06:46), Max: 36.8 (18 May 2019 16:48)  T(F): 98.2 (19 May 2019 06:46), Max: 98.3 (18 May 2019 16:48)  HR: 73 (19 May 2019 06:46) (54 - 73)  BP: 105/60 (19 May 2019 06:46) (95/57 - 111/61)  BP(mean): --  RR: 18 (19 May 2019 06:46) (16 - 18)  SpO2: 100% (19 May 2019 06:46) (97% - 100%)  I&O's Summary      GENERAL: NAD, well-developed  HEAD:  Atraumatic, Normocephalic  EYES: EOMI, PERRLA, conjunctiva and sclera clear  NECK: Supple, No JVD, No LAD, No carotid bruits  CHEST/LUNG: Clear to auscultation bilaterally; No wheezes  HEART: Regular rate and rhythm; No murmurs, rubs, or gallops  ABDOMEN: Soft, Nontender, Nondistended; Bowel sounds present  EXTREMITIES:  2+ Peripheral Pulses, No clubbing, cyanosis, or edema  SKIN: erythema rt arm and leg improved  NEURO: A+O x 3; nonfocal CN/motor/sensory/reflexes  PSYCH: Nl affect; no agitation or delirium; no suicidal or homicidal ideation    LABS:                        12.8   4.02  )-----------( 127      ( 19 May 2019 05:50 )             39.1         143  |  108<H>  |  22  ----------------------------<  88  4.2   |  26  |  1.25    Ca    8.8      19 May 2019 05:50  Phos  3.4       Mg     1.7         TPro  5.6<L>  /  Alb  3.5  /  TBili  1.0  /  DBili  x   /  AST  58<H>  /  ALT  64<H>  /  AlkPhos  65        CAPILLARY BLOOD GLUCOSE        CARDIAC MARKERS ( 19 May 2019 05:50 )  x     / x     / 258 u/L / x     / x      CARDIAC MARKERS ( 18 May 2019 13:45 )  x     / x     / 511 u/L / x     / x          Urinalysis Basic - ( 19 May 2019 07:00 )    Color: LIGHT YELLOW / Appearance: CLEAR / S.015 / pH: 7.0  Gluc: NEGATIVE / Ketone: NEGATIVE  / Bili: NEGATIVE / Urobili: NORMAL   Blood: NEGATIVE / Protein: NEGATIVE / Nitrite: NEGATIVE   Leuk Esterase: NEGATIVE / RBC: 0-2 / WBC 0-2   Sq Epi: x / Non Sq Epi: x / Bacteria: x        RADIOLOGY & ADDITIONAL TESTS:    Imaging Personally Reviewed:  [x] YES  [ ] NO    Consultant(s) Notes Reviewed:  [x] YES  [ ] NO      MEDICATIONS  (STANDING):  amoxicillin  875 milliGRAM(s)/clavulanate 1 Tablet(s) Oral two times a day  escitalopram 20 milliGRAM(s) Oral daily  lactated ringers. 1000 milliLiter(s) (100 mL/Hr) IV Continuous <Continuous>  traZODone 100 milliGRAM(s) Oral at bedtime    MEDICATIONS  (PRN):  diphenhydrAMINE 25 milliGRAM(s) Oral every 4 hours PRN Rash and/or Itching      Care Discussed with Consultants/Other Providers [x] YES  [ ] NO    HEALTH ISSUES - PROBLEM Dx:  Cellulitis of thigh: Cellulitis of thigh  Cellulitis of right upper extremity: Cellulitis of right upper extremity  Depression, unspecified depression type: Depression, unspecified depression type  Renal failure, unspecified chronicity: Renal failure, unspecified chronicity  Elevated CK: Elevated CK  Cellulitis of other specified site: Cellulitis of other specified site

## 2019-05-19 NOTE — PROGRESS NOTE ADULT - PROBLEM SELECTOR PLAN 3
- level = 511  - likely as sequela of intense exercise regimen, along with taking protein and other supplements (creatine, protein, CLA, carnitine, tea extracts...)  - could also be affected by current infection  - s/p 2 liters LR in the ED; additional fluids of LR at 100 mL/Hr x 20 hours in effect  - f/u repeat CK level

## 2019-05-19 NOTE — PROGRESS NOTE ADULT - PROBLEM SELECTOR PLAN 2
- right lower medial thigh area, with swelling, mild erythema  - coinciding w/ cellulitis of RUE  - management as above

## 2019-05-19 NOTE — CONSULT NOTE ADULT - PROBLEM SELECTOR RECOMMENDATION 9
Patient rapidly improving so will change patient to Augmentin 875mg PO BID with recommended 7 days so last dose 5/25.

## 2019-05-19 NOTE — CONSULT NOTE ADULT - PROBLEM SELECTOR RECOMMENDATION 3
decreased on repeat.    From an ID standpoint no further requirement for inpatient status for the management of ID issues. Fine with discharge from ID standpoint when other medical issues no longer require inpatient care and social issues allow for a safe discharge plan.    Thank you for consulting us and involving us in the management of this most interesting and challenging case.     Please Call with any further questions

## 2019-05-19 NOTE — PROGRESS NOTE ADULT - PROBLEM SELECTOR PLAN 4
- BUN/Cr = 23/1.50  - with CPK elevation of 511 and history of taking protein and other supplements in the setting of intense exercise regimen  - possibly also affected by infection  - no dysuria, no CVA tenderness  - improved  - advised to maintain adequate hydration

## 2019-05-19 NOTE — CONSULT NOTE ADULT - ASSESSMENT
43 year old male, with past history significant for Anxiety, Depression, Alcohol abuse (last use ~ 2017), and Smoking, admitted with infected bug bites/cellulitis

## 2019-05-20 ENCOUNTER — TRANSCRIPTION ENCOUNTER (OUTPATIENT)
Age: 44
End: 2019-05-20

## 2019-05-20 LAB
ANION GAP SERPL CALC-SCNC: 10 MMO/L — SIGNIFICANT CHANGE UP (ref 7–14)
BUN SERPL-MCNC: 19 MG/DL — SIGNIFICANT CHANGE UP (ref 7–23)
CALCIUM SERPL-MCNC: 9.1 MG/DL — SIGNIFICANT CHANGE UP (ref 8.4–10.5)
CHLORIDE SERPL-SCNC: 105 MMOL/L — SIGNIFICANT CHANGE UP (ref 98–107)
CK SERPL-CCNC: 130 U/L — SIGNIFICANT CHANGE UP (ref 30–200)
CO2 SERPL-SCNC: 27 MMOL/L — SIGNIFICANT CHANGE UP (ref 22–31)
CREAT SERPL-MCNC: 1.26 MG/DL — SIGNIFICANT CHANGE UP (ref 0.5–1.3)
GLUCOSE SERPL-MCNC: 86 MG/DL — SIGNIFICANT CHANGE UP (ref 70–99)
HCT VFR BLD CALC: 40.1 % — SIGNIFICANT CHANGE UP (ref 39–50)
HGB BLD-MCNC: 13.6 G/DL — SIGNIFICANT CHANGE UP (ref 13–17)
MAGNESIUM SERPL-MCNC: 1.8 MG/DL — SIGNIFICANT CHANGE UP (ref 1.6–2.6)
MCHC RBC-ENTMCNC: 31.8 PG — SIGNIFICANT CHANGE UP (ref 27–34)
MCHC RBC-ENTMCNC: 33.9 % — SIGNIFICANT CHANGE UP (ref 32–36)
MCV RBC AUTO: 93.7 FL — SIGNIFICANT CHANGE UP (ref 80–100)
NRBC # FLD: 0 K/UL — SIGNIFICANT CHANGE UP (ref 0–0)
PHOSPHATE SERPL-MCNC: 4 MG/DL — SIGNIFICANT CHANGE UP (ref 2.5–4.5)
PLATELET # BLD AUTO: 140 K/UL — LOW (ref 150–400)
PMV BLD: 8.4 FL — SIGNIFICANT CHANGE UP (ref 7–13)
POTASSIUM SERPL-MCNC: 4.1 MMOL/L — SIGNIFICANT CHANGE UP (ref 3.5–5.3)
POTASSIUM SERPL-SCNC: 4.1 MMOL/L — SIGNIFICANT CHANGE UP (ref 3.5–5.3)
RBC # BLD: 4.28 M/UL — SIGNIFICANT CHANGE UP (ref 4.2–5.8)
RBC # FLD: 12.7 % — SIGNIFICANT CHANGE UP (ref 10.3–14.5)
SODIUM SERPL-SCNC: 142 MMOL/L — SIGNIFICANT CHANGE UP (ref 135–145)
WBC # BLD: 5.6 K/UL — SIGNIFICANT CHANGE UP (ref 3.8–10.5)
WBC # FLD AUTO: 5.6 K/UL — SIGNIFICANT CHANGE UP (ref 3.8–10.5)

## 2019-05-20 RX ADMIN — Medication 1 TABLET(S): at 06:31

## 2019-05-20 RX ADMIN — ESCITALOPRAM OXALATE 20 MILLIGRAM(S): 10 TABLET, FILM COATED ORAL at 10:45

## 2019-05-20 RX ADMIN — Medication 25 MILLIGRAM(S): at 10:44

## 2019-05-20 NOTE — DISCHARGE NOTE NURSING/CASE MANAGEMENT/SOCIAL WORK - NSDCPEEMAIL_GEN_ALL_CORE
[facility] email [email or web address]

## 2019-05-20 NOTE — DISCHARGE NOTE PROVIDER - NSDCCPCAREPLAN_GEN_ALL_CORE_FT
PRINCIPAL DISCHARGE DIAGNOSIS  Diagnosis: Cellulitis  Assessment and Plan of Treatment: Please continue taking your medication as prescribed and make sure to take it with food to prevent upset stomach.

## 2019-05-20 NOTE — DISCHARGE NOTE NURSING/CASE MANAGEMENT/SOCIAL WORK - NSDCDPATPORTLINK_GEN_ALL_CORE
You can access the [facility] Patient Portal, offered by [facility], by registering with the following website: [email or web address]

## 2019-05-20 NOTE — DISCHARGE NOTE PROVIDER - NSDCFUADDINST_GEN_ALL_CORE_FT
Make sure to keep yourself well hydrated and take your Antibiotic as prescribed.  Take  your antibiotic with food to prevent upset stomach.  Prescription was sent to [place] pharmacy on record.

## 2019-05-20 NOTE — PROGRESS NOTE ADULT - SUBJECTIVE AND OBJECTIVE BOX
[facility], Division of Infectious Diseases  JUSTICE Graham A. Lee  [phone]    Name: RODNEY RAJAN  Age: 43y  Gender: Male  MRN: [id number]    Interval History--  Notes reviewed  no complaints  ready to go home.    Past Medical History--  Anxiety  Depression  ETOH abuse        For details regarding the patient's social history, family history, and other miscellaneous elements, please refer the initial infectious diseases consultation and/or the admitting history and physical examination for this admission.    Allergies    No Known Allergies    Intolerances        Medications--  Antibiotics:  amoxicillin  875 milliGRAM(s)/clavulanate 1 Tablet(s) Oral two times a day    Immunologic:    Other:  diphenhydrAMINE PRN  escitalopram  lactated ringers.  traZODone      Review of Systems--  A 10-point review of systems was obtained.     Pertinent positives and negatives--  Constitutional: No fevers. No Chills. No Rigors.   Cardiovascular: No chest pain. No palpitations.  Respiratory: No shortness of breath. No cough.  Gastrointestinal: No nausea or vomiting. No diarrhea or constipation.   Psychiatric: +depression    Review of systems otherwise negative except as previously noted.    Physical Examination--  Vital Signs: T(F): 98.1 (05-19-19 @ 21:11), Max: 98.1 (05-19-19 @ 21:11)  HR: 60 (05-19-19 @ 21:11)  BP: 107/65 (05-19-19 @ 21:11)  RR: 18 (05-19-19 @ 21:11)  SpO2: 97% (05-19-19 @ 21:11)  Wt(kg): --  General: Nontoxic-appearing Male in no acute distress.  HEENT: AT/NC. Anicteric. + proptopsis  Lungs: Clear bilaterally without rales, wheezing or rhonchi  Heart: Regular rate and rhythm. No Murmur. No rub.  Abdomen: Bowel sounds present and normoactive. Soft. Nondistended. Nontender  Back: No spinal tenderness. No costovertebral angle tenderness.   Extremities: No cyanosis or clubbing. No edema. rue decreased edema, no erythema,  nontender not warm  Skin: Warm. Dry. Good turgor. No rash. No vasculitic stigmata.  Psychiatric: Appropriate affect and mood for situation.         Laboratory Studies--  CBC                        13.6   5.60  )-----------( 140      ( 20 May 2019 05:45 )             40.1       Chemistries  05-20    142  |  105  |  19  ----------------------------<  86  4.1   |  27  |  1.26    Ca    9.1      20 May 2019 05:45  Phos  4.0     05-20  Mg     1.8     05-20    TPro  5.6<L>  /  Alb  3.5  /  TBili  1.0  /  DBili  x   /  AST  58<H>  /  ALT  64<H>  /  AlkPhos  65  05-19      Culture Data    Culture - Blood (collected 18 May 2019 16:30)  Source: BLOOD  Preliminary Report (19 May 2019 16:31):    NO ORGANISMS ISOLATED    NO ORGANISMS ISOLATED AT 24 HOURS    Culture - Blood (collected 18 May 2019 16:30)  Source: BLOOD PERIPHERAL  Preliminary Report (19 May 2019 16:31):    NO ORGANISMS ISOLATED    NO ORGANISMS ISOLATED AT 24 HOURS

## 2019-05-20 NOTE — DISCHARGE NOTE PROVIDER - HOSPITAL COURSE
43 year old male, with past history significant for Anxiety, Depression, Alcohol abuse (last use ~ 2017), and Smoking, presented to the ED, after being sent from an urgent care center, secondary to swelling and redness of the right arm and redness of the lateral right thigh.  Vital signs upon ED presentation as follows: BP = 98/59, HR = 72, RR = 16, T = 36.7 C (98 F), O2 Sat = 100% on ___.  X-rays of the areas negative for any acute findings.  Diagnosed with Cellulitis.  Admitted for further management of:         Problem/Plan - 1:    ·  Problem: Cellulitis of right upper extremity.  Plan: - RUE    - likely secondary to insect bites (has punctate areas on skin suggestive of same); possibility of mosquitos    - itchy, streaky erythema, swelling, along with pain of the digits     - subjective fever    - started on Unasyn and vancomycin in the ED; will continue with augmentin for now    - s/p one dose of steroid; prednisone 20 mg    - benadryl 25 mg PO Q4H PRN itching    - appreciate ID    - f/u cultures.          Problem/Plan - 2:    ·  Problem: Cellulitis of thigh.  Plan: - right lower medial thigh area, with swelling, mild erythema    - coinciding w/ cellulitis of RUE    - management as above. 43 year old male, with past history significant for Anxiety, Depression, Alcohol abuse (last use ~ 2017), and Smoking, presented to the ED, after being sent from an urgent care center, secondary to swelling and redness of the right arm and redness of the lateral right thigh.  Vital signs upon ED presentation as follows: BP = 98/59, HR = 72, RR = 16, T = 36.7 C (98 F), O2 Sat = 100% on ___.  X-rays of the areas negative for any acute findings.  Diagnosed with Cellulitis.  Admitted for further management of:         Problem/Plan - 1:    ·  Problem: Cellulitis of right upper extremity.  Plan: - RUE    - likely secondary to insect bites (has punctate areas on skin suggestive of same); possibility of mosquitos    - itchy, streaky erythema, swelling, along with pain of the digits     - subjective fever    - started on Unasyn and vancomycin in the ED; will continue with augmentin for now    - s/p one dose of steroid; prednisone 20 mg    - benadryl 25 mg PO Q4H PRN itching    - appreciate ID    - f/u cultures.          Problem/Plan - 2:    ·  Problem: Cellulitis of thigh.  Plan: - right lower medial thigh area, with swelling, mild erythema    - coinciding w/ cellulitis of RUE    - management as above.     (creatine, protein, CLA, carnitine, tea extracts...)    - could also be affected by current infection    - s/p 2 liters LR in the ED; additional fluids of LR at 100 mL/Hr x 20 hours in effect    - f/u repeat CK level-  5/20/19-  and cr 1.26-           Problem/Plan - 4:    ·  Problem: Renal failure, unspecified chronicity.  Plan: - BUN/Cr = 23/1.50    - with CPK elevation of 511 and history of taking protein and other supplements in the setting of intense exercise regimen    - possibly also affected by infection    - no dysuria, no CVA tenderness    - improved    - advised to maintain adequate hydration.          Problem/Plan - 5:    ·  Problem: Depression, unspecified depression type.  Plan: - asymptomatic presently    - continues on trazodone 100 mg HS and Lexapro 20 mg AC.

## 2019-05-20 NOTE — DISCHARGE NOTE PROVIDER - CARE PROVIDER_API CALL
Dino Roth)  Internal Medicine  [address] [facility] [address], [address]  [place] [address]  Phone: [phone]  Fax: [phone]  Follow Up Time:

## 2019-05-21 DIAGNOSIS — Z71.89 OTHER SPECIFIED COUNSELING: ICD-10-CM

## 2019-05-23 LAB
BACTERIA BLD CULT: SIGNIFICANT CHANGE UP
BACTERIA BLD CULT: SIGNIFICANT CHANGE UP

## 2019-06-01 ENCOUNTER — TRANSCRIPTION ENCOUNTER (OUTPATIENT)
Age: 44
End: 2019-06-01

## 2021-09-14 NOTE — H&P ADULT - ENDOCRINE
details… Zygomaticofacial Flap Text: Given the location of the defect, shape of the defect and the proximity to free margins a zygomaticofacial flap was deemed most appropriate for repair.  Using a sterile surgical marker, the appropriate flap was drawn incorporating the defect and placing the expected incisions within the relaxed skin tension lines where possible. The area thus outlined was incised deep to adipose tissue with a #15 scalpel blade with preservation of a vascular pedicle.  The skin margins were undermined to an appropriate distance in all directions utilizing iris scissors.  The flap was then placed into the defect and anchored with interrupted buried subcutaneous sutures.

## 2024-07-28 NOTE — ED ADULT NURSE NOTE - CAS EDN DISCHARGE ASSESSMENT
Is This A New Presentation, Or A Follow-Up?: Skin Lesion How Severe Is Your Skin Lesion?: moderate Has Your Skin Lesion Been Treated?: not been treated Patient baseline mental status/Alert and oriented to person, place and time Initial (On Arrival)

## 2024-09-27 ENCOUNTER — OFFICE (OUTPATIENT)
Facility: LOCATION | Age: 49
Setting detail: OPHTHALMOLOGY
End: 2024-09-27
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
## 2024-09-27 PROCEDURE — NO SHOW FE NO SHOW FEE: Performed by: OPHTHALMOLOGY

## 2025-02-06 NOTE — PATIENT PROFILE ADULT - NSPROGENSOURCEINFO_GEN_A_NUR
[facility] EMERGENCY DEPARTMENT  EMERGENCY DEPARTMENT ENCOUNTER        Pt Name: Shelby Silverio  MRN: [id number]  Birthdate 2009  Date of evaluation: 2/5/2025  Provider: BRAYAN Thomas CNP  PCP: Andre, Healthy  Note Started: 7:20 PM EST 2/5/25      WING. I have evaluated this patient.        CHIEF COMPLAINT       Chief Complaint   Patient presents with    Headache     Pt arrives from home by mother. Pt C/O HA and cough for 2-3 days. Per pt's mother, pt's grandmother, father and sister are sick.        HISTORY OF PRESENT ILLNESS: 1 or more Elements     History from : Patient and Family mom    Limitations to history : None    Shelby Silverio is a 15 y.o. female who presents to the emergency department with complaint of cough, fever, chills, headache, decreased appetite.  She has been sick for more than 48 hours.  She did not go to school today.  Multiple family members sick with similar symptoms    Denies any lightheadedness, dizziness, visual disturbances.  No chest pain or pressure.  No neck or back pain.  No shortness of breath.  No abdominal pain, vomiting, diarrhea, constipation, or dysuria.  No rash.    Nursing Notes were all reviewed and agreed with or any disagreements were addressed in the HPI.    REVIEW OF SYSTEMS :      Review of Systems   Constitutional:  Positive for chills, fatigue and fever. Negative for activity change.   HENT:  Positive for congestion.    Respiratory:  Positive for cough. Negative for chest tightness and shortness of breath.    Cardiovascular:  Negative for chest pain.   Gastrointestinal:  Negative for abdominal pain, diarrhea, nausea and vomiting.   Genitourinary:  Negative for dysuria.   Musculoskeletal:  Positive for myalgias.   Neurological:  Positive for headaches.   All other systems reviewed and are negative.      Positives and Pertinent negatives as per HPI.     SURGICAL HISTORY     Past Surgical History:   Procedure Laterality Date    MOUTH SURGERY    patient